# Patient Record
Sex: FEMALE | Race: WHITE | URBAN - METROPOLITAN AREA
[De-identification: names, ages, dates, MRNs, and addresses within clinical notes are randomized per-mention and may not be internally consistent; named-entity substitution may affect disease eponyms.]

---

## 2017-10-04 ENCOUNTER — OFFICE VISIT (OUTPATIENT)
Dept: FAMILY MEDICINE CLINIC | Facility: CLINIC | Age: 72
End: 2017-10-04

## 2017-10-04 DIAGNOSIS — Z23 NEED FOR PROPHYLACTIC VACCINATION WITH STREPTOCOCCUS PNEUMONIAE (PNEUMOCOCCUS) AND INFLUENZA VACCINES: Primary | ICD-10-CM

## 2017-10-04 PROCEDURE — G0009 ADMIN PNEUMOCOCCAL VACCINE: HCPCS | Performed by: NURSE PRACTITIONER

## 2017-10-04 PROCEDURE — 90670 PCV13 VACCINE IM: CPT | Performed by: NURSE PRACTITIONER

## 2019-04-25 ENCOUNTER — WALK IN (OUTPATIENT)
Dept: URGENT CARE | Age: 74
End: 2019-04-25

## 2019-04-25 DIAGNOSIS — Z23 NEED FOR VACCINATION: Primary | ICD-10-CM

## 2019-04-25 PROCEDURE — 90471 IMMUNIZATION ADMIN: CPT | Performed by: NURSE PRACTITIONER

## 2019-04-25 PROCEDURE — 90750 HZV VACC RECOMBINANT IM: CPT | Performed by: NURSE PRACTITIONER

## 2019-04-25 RX ORDER — MONTELUKAST SODIUM 10 MG/1
10 TABLET ORAL NIGHTLY
COMMUNITY

## 2019-04-25 RX ORDER — FEXOFENADINE HCL 180 MG/1
180 TABLET ORAL DAILY
COMMUNITY

## 2019-04-25 RX ORDER — METOPROLOL SUCCINATE 25 MG/1
25 TABLET, EXTENDED RELEASE ORAL DAILY
COMMUNITY

## 2025-02-10 NOTE — PROGRESS NOTES
FAMILY MEDICINE CLINIC NOTE    HPI  Korina Marrero is a 79 year old female presenting for     #A-fib  #HTN  -amlodipine 5 mg daily  -metoprolol succinate 50 mg daily  -xarelto 20 mg nightly  -cardiology Dr Earle Hanks  -recent ER visit 2/2025  -feeling well    #HLD  -atorvastatin 10 mg nightly  -last lipid panel 5/2024    #GERD  #Hiatal hernia  -pantoprazole 40 mg nightly   -reports has tried other medications    #MDD  -venlafaxine ER 75 mg nightly  -reports depression symptoms overall controlled  -some recent stress in life related to family    #Hearing loss  -bilateral  -hearing aids  -audiology exam 1/2022    #Allergies  -fexofenadine 180 mg daily    #Onychomycosis  -first two digits of feet  -podiatry - cannot remember name  -fluconazole 200 mg weekly    ROS  GENERAL: No fever/chills, no recent weight loss  HEENT: No visual changes, no changes in hearing, no sore throats  NECK: No pain, no swelling  RESP: No cough, no SOB  CV: No chest pain, no palpitations  GI: No abd pain, no N/V/D  MSK: No edema  SKIN: No new rashes  NEURO: No numbness, no tingling, no headaches    HEALTH MAINTENANCE  Health Maintenance Topics with due status: Overdue       Topic Date Due    Annual Physical Never done    Zoster Vaccines Never done    DEXA Scan Never done    Pneumococcal Vaccine: 50+ Years 10/04/2018    COVID-19 Vaccine Never done    Influenza Vaccine Never done    Annual Depression Screening Never done    Fall Risk Screening (Annual) Never done       ALLERGIES  Allergies[1]    MEDICATIONS  Current Outpatient Medications   Medication Sig Dispense Refill    fluconazole 200 MG Oral Tab Take 1 tablet (200 mg total) by mouth once a week.      amLODIPine 5 MG Oral Tab Take 1 tablet (5 mg total) by mouth daily. 90 tablet 3    atorvastatin 10 MG Oral Tab Take 1 tablet (10 mg total) by mouth nightly. 90 tablet 3    metoprolol succinate ER 50 MG Oral Tablet 24 Hr Take 1 tablet (50 mg total) by mouth daily. 90 tablet 3     pantoprazole 20 MG Oral Tab EC Take 2 tablets (40 mg total) by mouth every morning before breakfast. 180 tablet 3    venlafaxine ER 75 MG Oral Capsule SR 24 Hr Take 1 capsule (75 mg total) by mouth daily. 90 capsule 3    rivaroxaban 20 MG Oral Tab Take 1 tablet (20 mg total) by mouth daily with food. 90 tablet 3    cholecalciferol (D 1000) 25 MCG (1000 UT) Oral Cap Take 25 mcg by mouth daily.      fexofenadine 180 MG Oral Tab Take 1 tablet (180 mg total) by mouth daily.         ACTIVE PROBLEMS  Patient Active Problem List   Diagnosis    Bilateral hearing loss    Essential hypertension, benign    GERD (gastroesophageal reflux disease)    Hiatal hernia    Paroxysmal atrial fibrillation (HCC)    Hyperlipidemia    Current mild episode of major depressive disorder without prior episode    Environmental allergies    Onychomycosis    Health maintenance examination       PAST MEDICAL HISTORY  Past Medical History:    Bilateral hearing loss    Current mild episode of major depressive disorder without prior episode    Environmental allergies    Essential hypertension, benign    GERD (gastroesophageal reflux disease)    Hyperlipidemia    Onychomycosis    Paroxysmal atrial fibrillation (HCC)    S/P cryoPVI and CTI in          PAST SOCIAL HISTORY  Social History     Socioeconomic History    Marital status:      Spouse name: Not on file    Number of children: Not on file    Years of education: Not on file    Highest education level: Not on file   Occupational History    Not on file   Tobacco Use    Smoking status: Former     Current packs/day: 0.00     Average packs/day: 0.1 packs/day for 3.0 years (0.3 ttl pk-yrs)     Types: Cigarettes     Start date:      Quit date:      Years since quittin.1    Smokeless tobacco: Never   Vaping Use    Vaping status: Never Used   Substance and Sexual Activity    Alcohol use: Yes     Comment: Occasional - 1 glass of wine a month    Drug use: Never    Sexual activity:  Yes     Partners: Male   Other Topics Concern    Not on file   Social History Narrative    Relationships:     Children:     Pets:     School:     Work:     Origin:     Interests:     Spiritual:     Social Drivers of Health     Food Insecurity: No Food Insecurity (5/9/2024)    Received from [facility]    Food Insecurity     Currently or in the past 3 months, have you worried your food would run out before you had money to buy more?: No     In the past 12 months, have you run out of food or been unable to get more?: No   Transportation Needs: No Transportation Needs (5/9/2024)    Received from [facility]    Transportation Needs     Currently or in the past 3 months, has lack of transportation kept you from medical appointments, getting food or medicine, or providing care to a family member?: Unrecognized value     : Not on file     Medical Transportation Needs?: No     Daily Living Transportation Needs? [Peds Only] : Not on file   Stress: Not on file   Housing Stability: Low Risk  (7/8/2021)    Received from [facility]    Housing Stability     Mortgage Payment Concerns?: Not on file     Number of Places Lived in the Last Year: Not on file     Unstable Housing?: Not on file       PAST SURGICAL HISTORY  Past Surgical History:   Procedure Laterality Date    Ablate heart dysrhythm focus  01/28/2016    cryoballoon PVI isolation and CTI ablation by Dr. Gray    Cataract extraction Bilateral     Cholecystectomy      Hip replacement surgery Right     Hysterectomy      ROSANA and BSO       PAST FAMILY HISTORY  Family History   Problem Relation Age of Onset    Cancer Mother         Throat    Stroke Father     Melanoma Father     Hypertension Brother     Cancer Maternal Grandmother     No Known Problems Maternal Grandfather     No Known Problems Paternal Grandmother     No Known Problems Paternal Grandfather     Colon Cancer Neg     Breast Cancer Neg          PHYSICAL  EXAM  Vitals:    02/12/25 1004   BP: 120/72   Pulse: 62   Temp: 97.6 °F (36.4 °C)   SpO2: 99%   Weight: 154 lb (69.9 kg)   Height: 5' 5\" (1.651 m)      Body mass index is 25.63 kg/m².    GENERAL: NAD  HEENT: Wearing hearing aids bilaterally  RESP: Non-labored respirations, CTAB, no wheezing, no rales, no rhonchi  CV: RRR, no murmurs  GI: Soft, non-distended, non-tender, no guarding, no rebound, no masses  MSK: No edema  SKIN: Warm and dry, left first toenail slightly yellow in color, right first toenail also slightly yellow (much more mild)  NEURO: Answering questions appropriately    LABS  No results found for: \"WBC\", \"HGB\", \"HCT\", \"PLT\", \"NEPERCENT\", \"LYPERCENT\", \"MOPERCENT\", \"EOPERCENT\", \"BAPERCENT\", \"NE\", \"LYMABS\", \"MOABSO\", \"EOABSO\", \"BAABSO\", \"REITCPERCENT\"    No results found for: \"NA\", \"K\", \"CL\", \"CO2\", \"ANIONGAP\", \"BUN\", \"CREATSERUM\", \"BUNCREA\", \"GLU\", \"CA\", \"OSMOCALC\", \"GFRNAA\", \"GFRAA\", \"ALT\", \"AST\", \"ALKPHO\", \"BILT\", \"TP\", \"ALB\", \"GLOBULIN\", \"ELECTAG\", \"FASTING\"      No results found for: \"CHOLEST\", \"TRIG\", \"HDL\", \"LDL\", \"VLDL\", \"TCHDLRATIO\", \"NONHDLC\", \"CHOLHDLRATIO\", \"CALCNONHDL\"     DIAGNOSTICS      ASSESSMENT/PLAN  Problem List Items Addressed This Visit          HCC Problems    Current mild episode of major depressive disorder without prior episode     Patient with a history of major depressive disorder  She notes very mild depression symptoms given recent stressor  Overall typically well-managed however  Continue venlafaxine ER 75 mg daily  Can follow-up as needed.         Relevant Medications    venlafaxine ER 75 MG Oral Capsule SR 24 Hr    Paroxysmal atrial fibrillation (HCC)     Patient with history of paroxysmal A-fib.  Follows with cardiology  Currently on metoprolol succinate 50 mg daily as well as rivaroxaban 20 mg nightly         Relevant Medications    amLODIPine 5 MG Oral Tab    metoprolol succinate ER 50 MG Oral Tablet 24 Hr    rivaroxaban 20 MG Oral Tab       Cardiac and Vasculature     Essential hypertension, benign - Primary (Chronic)     Blood pressures are well-controlled.  Continue amlodipine 5 mg daily and metoprolol succinate 50 mg daily         Relevant Medications    amLODIPine 5 MG Oral Tab    metoprolol succinate ER 50 MG Oral Tablet 24 Hr    Hyperlipidemia     Patient with hyperlipidemia.  Continue atorvastatin 10 mg nightly  Will plan to repeat lipid panel at next office visit         Relevant Medications    atorvastatin 10 MG Oral Tab       Gastrointestinal and Abdominal    GERD (gastroesophageal reflux disease) (Chronic)     Patient with severe GERD and hiatal hernia.  She reports that her symptoms are managed with pantoprazole 40 mg or 20 mg daily         Relevant Medications    amLODIPine 5 MG Oral Tab    pantoprazole 20 MG Oral Tab EC    Hiatal hernia     Patient with severe GERD and hiatal hernia.  She reports that her symptoms are managed with pantoprazole 40 mg or 20 mg daily         Relevant Medications    pantoprazole 20 MG Oral Tab EC       Allergies and Adverse Reactions    Environmental allergies     Patient with environmental allergies.  Uses fexofenadine 180 mg daily         Relevant Medications    fexofenadine 180 MG Oral Tab       EarNoseThroat    Bilateral hearing loss (Chronic)     Patient with bilateral hearing loss  Uses hearing aids.         Relevant Medications    fexofenadine 180 MG Oral Tab       Skin    Onychomycosis     Patient with onychomycosis in the left first toe greater than right first toe.  Currently on fluconazole prescribed by podiatrist         Relevant Medications    fluconazole 200 MG Oral Tab       Health Encounters    Health maintenance examination     DEXA bone density scan ordered.  Follow-up in 6 months for Medicare visit          Other Visit Diagnoses       Asymptomatic menopausal state        Relevant Medications    amLODIPine 5 MG Oral Tab    metoprolol succinate ER 50 MG Oral Tablet 24 Hr    Other Relevant Orders    XR DEXA BONE  DENSITOMETRY (CPT=77080)            Return in about 6 months (around 2025) for medicare visit.    No topic due editable text       Sandip Hunt MD  Family Medicine           Pre-chartin minutes  Reviewing/obtainin minutes  Medical Exam:1 minutes  Counseling/education: 5 minutes  Notes: 5 minutes  Referring/communicatin minutes  Care coordination: 0 minutes    My total time spent caring for the patient on the day of the encounter: 43 minutes         [1]   Allergies  Allergen Reactions    Epinephrine OTHER (SEE COMMENTS) and UNKNOWN     A-fib    Sulfa Antibiotics RASH

## 2025-02-12 NOTE — ASSESSMENT & PLAN NOTE
Patient with history of paroxysmal A-fib.  Follows with cardiology  Currently on metoprolol succinate 50 mg daily as well as rivaroxaban 20 mg nightly

## 2025-02-12 NOTE — ASSESSMENT & PLAN NOTE
Blood pressures are well-controlled.  Continue amlodipine 5 mg daily and metoprolol succinate 50 mg daily

## 2025-02-12 NOTE — ASSESSMENT & PLAN NOTE
Patient with hyperlipidemia.  Continue atorvastatin 10 mg nightly  Will plan to repeat lipid panel at next office visit

## 2025-02-12 NOTE — ASSESSMENT & PLAN NOTE
Patient with onychomycosis in the left first toe greater than right first toe.  Currently on fluconazole prescribed by podiatrist

## 2025-02-12 NOTE — PATIENT INSTRUCTIONS
PATIENT INSTRUCTIONS    Thank you for seeing me today, it was a pleasure taking care of you.  Please check out at the  and schedule a follow up appointment.  Return in about 6 months (around 8/12/2025) for medicare visit.  Please remember that the preferred rainer period for appointments is 5 minutes. This is to help maximize the amount of time that we can spend together at our visits.    Continue all your current medications  Continue to follow with cardiology   Continue to follow with podiatry  DEXA bone density  Please call [phone] to schedule your imaging appointment.     Dr. Gilbert Purcell

## 2025-02-12 NOTE — ASSESSMENT & PLAN NOTE
Patient with severe GERD and hiatal hernia.  She reports that her symptoms are managed with pantoprazole 40 mg or 20 mg daily

## 2025-04-30 NOTE — TELEPHONE ENCOUNTER
Received a medical records request from [facility].    Faxed request to Medicare Records Department with successful confirmation.    Sent original documents to Medical records via inter-office mail.    KG

## 2025-06-20 NOTE — TELEPHONE ENCOUNTER
She  will go to [facility] when she is back from [place].  Reason for Disposition   MODERATE back pain (e.g., interferes with normal activities) and present > 3 days    Answer Assessment - Initial Assessment Questions  1. ONSET: \"When did the pain begin?\"       Couple of months   2. LOCATION: \"Where does it hurt?\" (upper, mid or lower back)     Lower left back   3. SEVERITY: \"How bad is the pain?\"  (e.g., Scale 1-10; mild, moderate, or severe)    - MILD (1-3): Doesn't interfere with normal activities.     - MODERATE (4-7): Interferes with normal activities or awakens from sleep.     - SEVERE (8-10): Excruciating pain, unable to do any normal activities.       6/10   4. PATTERN: \"Is the pain constant?\" (e.g., yes, no; constant, intermittent)       Intermittent worse with movement  5. RADIATION: \"Does the pain shoot into your legs or somewhere else?\"      Left leg  6. CAUSE:  \"What do you think is causing the back pain?\"       Sciatica  7. BACK OVERUSE:  \"Any recent lifting of heavy objects, strenuous work or exercise?\"      No   8. MEDICINES: \"What have you taken so far for the pain?\" (e.g., nothing, acetaminophen, NSAIDS)      No   9. NEUROLOGIC SYMPTOMS: \"Do you have any weakness, numbness, or problems with bowel/bladder control?\"      No   10. OTHER SYMPTOMS: \"Do you have any other symptoms?\" (e.g., fever, abdomen pain, burning with urination, blood in urine)      No    11. PREGNANCY: \"Is there any chance you are pregnant?\" \"When was your last menstrual period?\"        NA    Protocols used: Back Pain-A-[place]